# Patient Record
(demographics unavailable — no encounter records)

---

## 2022-05-12 NOTE — NM
EXAMINATION TYPE: NM DatScan Brain SPECT

 

DATE OF EXAM: 5/11/2022

 

COMPARISON: NONE

 

HISTORY: Tremors

 

TECHNIQUE:  10 drops of Lugol's solution was administered 1 hour prior to injection as a thyroid bloc
renata agent.  After the administration of 4.5 mCi I-123 Ioflupane DaTscan.  Images obtained 3 hours po
st injection.  SPECT images of the brain were acquired with axial and coronal reconstructions.  

 

 

FINDINGS: The axial SPECT images demonstrate normal background activity.  Accounting for head tilt, t
here appears to be slight asymmetrically blunted comma-shaped appearance of the right corpus striatum
.  

 

IMPRESSION: Slightly blunted striatal activity on the left may indicate early changes of idiopathic P
arkinson's disease or Parkinsonian syndrome.

## 2022-07-21 NOTE — CTL
EXAMINATION TYPE:  CT Low Dose Lung

 

DATE OF EXAM ORDERED: 7/21/2022

 

HISTORY: History of smoking. Lung cancer screening

 

CT DLP: 80.7 mGycm

CT CTDI: 2.5 mGy

Automated exposure control for dose reduction was used.

 

SCREENING VISIT: Initial screening visit

 

COMPARISON: None

 

TECHNIQUE: Low dose computed tomography scan was performed through the chest at 1 mm thick sections a
nd reconstructed images in multiple planes at 1 mm and 5 mm thick sections.

 

CT DIAGNOSTIC QUALITY: Satisfactory

 

FINDINGS:

LUNG NODULES: None.

LUNGS:

COPD: Severity: Mild

Fibrosis: Severity: None

Lymph nodes: None

Other findings: Streaky atelectasis changes are seen within the lung bases. Atelectasis changes withi
n the lingula.

 

RIGHT PLEURAL SPACE:

Effusion: None

Calcification: None

Thickening: None

Pneumothorax: None

 

LEFT PLEURAL SPACE:

Effusion: None

Calcification: None

Thickening: None

Pneumothorax: None

 

HEART:  

Heart Size: Normal

Coronary Calcification: Moderate

Pericardial Effusion: None

 

OTHER FINDINGS:

Upper abdomen: None

Bony thorax: No evidence for acute pathology, moderate multilevel disc degeneration changes throughou
t the spine.7

Supraclavicular region: None

Other: None

 

IMPRESSION:

1.  No clinically significant pulmonary nodules.

2.  Mild COPD

3.  Moderate coronary artery atherosclerosis.

 

CT LUNG RAD AND CT CHEST RECOMMENDATION: Lung-Rad 1 Negative: Continue annual screening with LDCT in 
12 months.

 

S Modifier (other clinically significant findings): S, moderate coronary artery atherosclerosis.